# Patient Record
Sex: MALE | Race: WHITE | Employment: UNEMPLOYED | URBAN - NONMETROPOLITAN AREA
[De-identification: names, ages, dates, MRNs, and addresses within clinical notes are randomized per-mention and may not be internally consistent; named-entity substitution may affect disease eponyms.]

---

## 2020-06-18 RX ORDER — LISINOPRIL 5 MG/1
5 TABLET ORAL DAILY
COMMUNITY

## 2020-06-18 RX ORDER — METFORMIN HYDROCHLORIDE 500 MG/1
1000 TABLET, EXTENDED RELEASE ORAL 2 TIMES DAILY
COMMUNITY

## 2021-04-30 ENCOUNTER — NURSE ONLY (OUTPATIENT)
Dept: SURGERY | Age: 61
End: 2021-04-30

## 2021-04-30 VITALS
HEIGHT: 69 IN | WEIGHT: 169 LBS | HEART RATE: 73 BPM | BODY MASS INDEX: 25.03 KG/M2 | DIASTOLIC BLOOD PRESSURE: 60 MMHG | TEMPERATURE: 98 F | SYSTOLIC BLOOD PRESSURE: 120 MMHG

## 2021-04-30 DIAGNOSIS — Z12.11 ENCOUNTER FOR SCREENING COLONOSCOPY: Primary | ICD-10-CM

## 2021-04-30 RX ORDER — ASCORBIC ACID 500 MG
500 TABLET ORAL DAILY
COMMUNITY
Start: 2020-11-03 | End: 2021-11-03

## 2021-04-30 RX ORDER — FLUOXETINE 10 MG/1
TABLET, FILM COATED ORAL
COMMUNITY
Start: 2021-04-12 | End: 2022-04-12

## 2021-04-30 RX ORDER — ATORVASTATIN CALCIUM 20 MG/1
20 TABLET, FILM COATED ORAL DAILY
COMMUNITY
Start: 2021-03-08 | End: 2022-03-08

## 2021-04-30 RX ORDER — ZINC GLUCONATE 50 MG
1 TABLET ORAL DAILY
COMMUNITY
Start: 2020-11-03 | End: 2021-11-03

## 2021-04-30 SDOH — HEALTH STABILITY: MENTAL HEALTH: HOW MANY STANDARD DRINKS CONTAINING ALCOHOL DO YOU HAVE ON A TYPICAL DAY?: NOT ASKED

## 2021-04-30 NOTE — PROGRESS NOTES
General Surgery History & Physical  [facility], LPN  Pt Name: Laisha Dean  MRN: [id number]  [place]: 1960  Date of evaluation: 4/30/2021  Primary Care Physician: Theresa Kent    Patient evaluated at the request of [id number] [facility],[address]  Reason for evaluation: screening colonoscopy       SUBJECTIVE:  Chief Complaint:   Chief Complaint   Patient presents with    Colonoscopy     screening colonoscopy               Colonoscopy  Abd pain: no  Anemia: no  Bloating:no  Diarrhea: no  Constipation: no  Melena: no  Hematochezia:no  Rectal Bleeding:no  Rectal/Anal Pain:no  Pruritus: no  Family history colon Cancer: no  Previous colon cancer: no  Previous Colon Polyp: no  Change in bowels: no  Decrease caliber of stool: no  Change in color of stool: no  Previous work up date: none    Past Medical History   has a past medical history of Dyslipidemia, Hx of bladder cancer, Smoker, and Type 2 diabetes mellitus without complication ([facility] [address] Past Surgical History   has no past surgical history on file. Family History  family history is not on file. Social History  Tobacco use:  reports that he has been smoking. He has been smoking about 0.50 packs per day. He has never used smokeless tobacco.  Alcohol use:  reports no history of alcohol use. Drug use:  reports current drug use. Drug: Marijuana.       Medications  Current Medications:   Current Outpatient Medications   Medication Sig Dispense Refill    zinc 50 MG TABS tablet Take 1 tablet by mouth daily      ascorbic acid (VITAMIN C) 500 MG tablet Take 500 mg by mouth daily      atorvastatin (LIPITOR) 20 MG tablet Take 20 mg by mouth daily      vitamin D 25 MCG (1000 UT) CAPS Take 1,000 Units by mouth daily      FLUoxetine (PROZAC) 10 MG tablet take 1 tablet by mouth once daily for anxiety      lisinopril (PRINIVIL;ZESTRIL) 5 MG tablet Take 5 mg by mouth daily      blood glucose test strips (ASCENSIA AUTODISC VI;ONE TOUCH ULTRA TEST VI) strip 1 each by In Vitro route daily As needed.  Blood Glucose Monitoring Suppl (ACURA BLOOD GLUCOSE METER) w/Device KIT by Does not apply route      metFORMIN (GLUCOPHAGE-XR) 500 MG extended release tablet Take 1,000 mg by mouth 2 times daily      SITagliptin (JANUVIA) 100 MG tablet Take 100 mg by mouth daily       No current facility-administered medications for this visit. Home Medications:   Prior to Admission medications    Medication Sig Start Date End Date Taking? Authorizing Provider   zinc 50 MG TABS tablet Take 1 tablet by mouth daily 11/3/20 11/3/21 Yes Historical Provider, MD   ascorbic acid (VITAMIN C) 500 MG tablet Take 500 mg by mouth daily 11/3/20 11/3/21 Yes Historical Provider, MD   atorvastatin (LIPITOR) 20 MG tablet Take 20 mg by mouth daily 3/8/21 3/8/22 Yes Historical Provider, MD   vitamin D 25 MCG (1000 UT) CAPS Take 1,000 Units by mouth daily 11/3/20 11/3/21 Yes Historical Provider, MD   FLUoxetine (PROZAC) 10 MG tablet take 1 tablet by mouth once daily for anxiety 4/12/21 4/12/22 Yes Historical Provider, MD   lisinopril (PRINIVIL;ZESTRIL) 5 MG tablet Take 5 mg by mouth daily   Yes Historical Provider, MD   blood glucose test strips (ASCENSIA AUTODISC VI;ONE TOUCH ULTRA TEST VI) strip 1 each by In Vitro route daily As needed. Yes Historical Provider, MD   Blood Glucose Monitoring Suppl (ACURA BLOOD GLUCOSE METER) w/Device KIT by Does not apply route   Yes Historical Provider, MD   metFORMIN (GLUCOPHAGE-XR) 500 MG extended release tablet Take 1,000 mg by mouth 2 times daily   Yes Historical Provider, MD   SITagliptin (JANUVIA) 100 MG tablet Take 100 mg by mouth daily   Yes Historical Provider, MD       Allergies  Patient has no known allergies. Review of Systems:  General: Denies any fever, chills. HEENT: Denies any diplopia, tinnitus or vertigo. Respiratory: Denies any shortness of breath or cough. Cardiac: Denies any chest pain, palpitations, claudication or edema.   Gastrointestinal: Denies any melena, hematochezia, hematemesis or pyrosis. Genitourinary: Denies any frequency, urgency, hesitancy or incontinence. Hematologic: Denies bruising or bleeding easily. Endocrine: Denies any history of diabetes or thyroid disease. PHYSICAL EXAMINATION  Vitals:   Vitals:    04/30/21 0914   BP: 120/60   Pulse: 73   Temp: 98 °F (36.7 °C)     General Appearance:  awake, alert, oriented, in no acute distress, well developed, well nourished and in no acute distress  Skin:  Skin color, texture, turgor normal. No rashes or lesions. Head/face:  NCAT  Eyes:  No gross abnormalities. , PERRL, Pupils- PERRL. Ears:  canals and TMs NI and External- normal  Nose/Sinuses:  Nares normal. Septum midline. Mucosa normal. No drainage or sinus tenderness. Mouth/Throat:  Mucosa moist.  No lesions. Pharynx without erythema, edema or exudate. Lungs:  Normal expansion. Clear to auscultation. No rales, rhonchi, or wheezing., Breathing Pattern: regular, no distress, Breath sounds: normal  Heart:  Heart sounds are normal.  Regular rate and rhythm without murmur, gallop or rub. Auscultation: Normal S1 and S2.  Regular rhythm. No murmurs, gallops, or rubs. Abdomen:  Soft, non-tender, normal bowel sounds. No bruits, organomegaly or masses.   Musculoskeletal:  negative, negative findings: no erythema, induration, or nodules, ROM of all joints is normal, strength normal  Neurologic:  negative findings: proximal muscle strength normal, speech normal, mental status intact, cranial nerves 2-12 intact, muscle tone normal, muscle strength normal    LABS:  CBC No results found for: WBC, HGB, HCT, PLT  BMP No results found for: NA, K, CL, CO2, BUN, CREATININE, PHOS, MG  LFT's: No results found for: AST, ALT, ALKPHOS, BILITOT, BILIDIR, AMYLASE, LIPASE, LACTATE  COAGS: No results found for: INR, PTT  Lipids: No results found for: CHOL, HDL, LDLCHOLESTEROL, CHOLHDLRATIO, TRIG, VLDL    RADIOLOGY:  All images reviewed and within normal limits unless otherwise specified: No    IMPRESSIONS:  Surgical Risk: low risk    PLAN:  1. Screening colonoscopy    Medical Decision Making: low complexity    Thank you for the interesting evaluation. Further recommendations to follow.     Abby Colon LPN  Electronically signed 4/30/2021 at 9:25 AM